# Patient Record
Sex: MALE | Race: OTHER | Employment: OTHER | URBAN - METROPOLITAN AREA
[De-identification: names, ages, dates, MRNs, and addresses within clinical notes are randomized per-mention and may not be internally consistent; named-entity substitution may affect disease eponyms.]

---

## 2018-03-22 ENCOUNTER — OFFICE VISIT (OUTPATIENT)
Dept: FAMILY MEDICINE CLINIC | Age: 23
End: 2018-03-22

## 2018-03-22 VITALS
TEMPERATURE: 97.8 F | HEIGHT: 62 IN | WEIGHT: 130 LBS | DIASTOLIC BLOOD PRESSURE: 84 MMHG | HEART RATE: 62 BPM | SYSTOLIC BLOOD PRESSURE: 136 MMHG | BODY MASS INDEX: 23.92 KG/M2

## 2018-03-22 DIAGNOSIS — K21.9 GASTROESOPHAGEAL REFLUX DISEASE, ESOPHAGITIS PRESENCE NOT SPECIFIED: Primary | ICD-10-CM

## 2018-03-22 RX ORDER — RANITIDINE 150 MG/1
150 TABLET, FILM COATED ORAL 2 TIMES DAILY
Qty: 60 TAB | Refills: 1 | Status: SHIPPED | OUTPATIENT
Start: 2018-03-22 | End: 2018-05-24 | Stop reason: ALTCHOICE

## 2018-03-22 NOTE — PROGRESS NOTES
Assessment/Plan:    Diagnoses and all orders for this visit:    1. Gastroesophageal reflux disease, esophagitis presence not specified  -     raNITIdine (ZANTAC) 150 mg tablet; Take 1 Tab by mouth two (2) times a day. [address] [employer] veces al alissa    Lifestyle changes encouraged  Recheck in 2 months    Follow-up Disposition:  Return in about 2 months (around 5/22/2018). CHARLOTTE Menezes expressed understanding of this plan. An AVS was printed and given to the patient.      ----------------------------------------------------------------------    Chief Complaint   Patient presents with    Abdominal Pain     pt c/o burning in abdomen and after eating it goes away but get nauseous x1 year. History of Present Illness:  24 yo generally healthy male here for one year of burning epigastric pain. Eating helps the pain for an hour or two then the pain returns. No weight changes. No vomiting. No diarrhea. No blood in stool. No change in stool. His daily food intake is made up almost entirely of fast food- pizza from [employer] for breakfast, etc. He almost never has home cooked meals. Almost never eats fruits/veggies. Sometimes he smokes cigarettes and sometimes he drinks beer      No past medical history on file. Current Outpatient Prescriptions   Medication Sig Dispense Refill    raNITIdine (ZANTAC) 150 mg tablet Take 1 Tab by mouth two (2) times a day. [place] [address] [employer] veces al alissa 60 Tab 1       No Known Allergies    Social History   Substance Use Topics    Smoking status: Current Some Day Smoker     Types: Cigarettes    Smokeless tobacco: Never Used      Comment: x5 a ek    Alcohol use Yes      Comment: x8 a week       No family history on file.     Physical Exam:     Visit Vitals    /84 (BP 1 Location: Right arm)    Pulse 62    Temp 97.8 °F (36.6 °C) (Oral)    Ht 5' 2.4\" (1.585 m)    Wt 130 lb (59 kg)    BMI 23.47 kg/m2       A&Ox3  WDWN NAD  Respirations normal and non labored  abd- flat, no distention, no masses, no HSM, not tender to palpation or percussion

## 2018-03-22 NOTE — PROGRESS NOTES
AVS printed and reviewed. E script discussed. Good RX coupon is not required. Cost at [facility] $4. Declined flu shot offered today. Discussion assisted by CAV , Yrn Horton.

## 2018-03-22 NOTE — PATIENT INSTRUCTIONS
Enfermedad de reflujo gastroesofágico (GERD): Instrucciones de cuidado - [ Gastroesophageal Reflux Disease (GERD): Care Instructions ]  Instrucciones de cuidado    La enfermedad de reflujo gastroesofágico (GERD, por bert siglas en inglés) es cuando el ácido estomacal se devuelve hacia el esófago. El esófago es el conducto que va de la garganta al [facility]. Olivia válvula de olivia prashanth vía rome que el ácido del estómago se devuelva por sakshi conducto. Cuando usted tiene GERD, esta válvula no se deric lo suficientemente firme. Si usted tiene síntomas leves de GERD, sadaf acidez estomacal, es posible que pueda controlar el problema con antiácidos o medicamentos de [employer]. Cambiar la alimentación, bajar de peso y hacer otros cambios en el estilo de vivek también pueden ayudar a reducir los síntomas. La atención de seguimiento es olivia parte clave de gates tratamiento y seguridad. Asegúrese de hacer y acudir a todas las citas, y llame a gates médico si está teniendo problemas. También es olivia buena idea saber los resultados de los exámenes y mantener olivia lista de los medicamentos que nohemy. ¿Cómo puede cuidarse en el hogar? · [place] [employer] medicamentos exactamente sadaf le fueron recetados. Llame a gates médico si celso estar teniendo problemas con gates medicamento. · Gates médico le podría recomendar medicamentos de [employer]. Para la indigestión leve u ocasional pueden servirle los antiácidos sadaf Tums, Gaviscon, Mylanta o Maalox. Gates médico también podría recomendar reductores de ácido de venta ant, sadaf Pepcid AC, Tagamet HB, Zantac 75 o Prilosec. Sherri y siga todas las instrucciones de la [place]. Si [place] estos medicamentos con frecuencia, hable con gates médico.  · Cambie bert hábitos alimenticios. ¨ Es mejor comer varias comidas pequeñas en lugar de dos o eran comidas abundantes. ¨ Después de comer, espere de 2 a 3 horas antes de recostarse. ¨ El chocolate, la menta y el alcohol pueden empeorar la GERD.   ¨ En [employer], los alimentos condimentados, los alimentos que tienen mucho ácido (sadaf los tomates y las naranjas) y el café pueden empeorar los síntomas de la GERD. Si adri síntomas empeoran después de comer un determinado alimento, se recomienda que deje de comer iwona alimento para bharati si adri síntomas mejoran. · No fume ni masque tabaco. Fumar puede agravar la GERD. Si necesita ayuda para dejar de usar tabaco, hable con gates médico [employer] y medicamentos para dejar de usarlo. Éstos pueden aumentar adri probabilidades de dejar el hábito para siempre. · Si tiene síntomas de GERD sari la noche, eleve la cabecera de gates cama entre 6 y 6 pulgadas (entre 15 y 20 cm) colocando ladrillos debajo del cristina de la cama o olivia cuña de espuma debajo de la cabecera del colchón. (Usar almohadas adicionales no funciona). · No use ropa que le ajuste mucho la parte media del cuerpo. · Baje de peso, si necesita hacerlo. Perder sólo de 5 a 10 libras (2.3 a 4.5 kg) puede ayudarle. ¿Cuándo debe pedir ayuda? Llame a gates médico ahora mismo o busque atención médica inmediata si:  ? · Tiene un dolor de estómago nuevo o distinto. ? · Adri heces son negruzcas y parecidas al alquitrán o tienen rastros de Angie. ?Preste especial atención a los cambios en gates ghada y asegúrese de comunicarse con gates médico si:  ? · Adri síntomas no paredes shoshana después de 2 días. ? · La comida parece quedarle atorada en la garganta o el pecho. ¿Dónde puede encontrar más información en inglés? Palomares Filler a [email or web address] Lelia Smith C146 en la búsqueda para aprender más acerca de \"Enfermedad de reflujo gastroesofágico (GERD): Instrucciones de cuidado - [ Gastroesophageal Reflux Disease (GERD): Care Instructions ]. \"  Revisado: 12 merida, 2017  Versión del contenido: 11.4  © 3085-2424 Healthwise, [employer].  Las instrucciones de cuidado fueron adaptadas bajo licencia por Good Help Connections (which disclaims liability or warranty for this information). Si usted tiene [place] afección médica o sobre estas instrucciones, siempre pregunte a gates profesional de ghada. [employer] [place] niega toda garantía o responsabilidad por gates uso de esta información.

## 2018-03-22 NOTE — MR AVS SNAPSHOT
Serabridget Gale 
 
 
 [address] [place] [address] 
[phone] Patient: Marylu Irizarry MRN: [id number] HGM:[id number] Visit Information Oralia Allen y Soraya Personal Médico Departamento Teléfono del Dep. Número de visita 3/22/2018  8:30 AM Zhou Zab[address], BLAINE [place] [phone] Follow-up Instructions Return in about 2 months (around 5/22/2018). Upcoming Health Maintenance Date Due DTaP/Tdap/Td series (1 - Tdap) 9/13/2014 Influenza Age 5 to Adult 8/1/2017 Alergias  Review Complete El: 3/22/2018 Por: Melissa Atkinson A partir del:  3/22/2018 No Known Allergies Vacunas actuales Rexine Smart No hay ninguna vacuna archivada. No revisadas esta visita You Were Diagnosed With   
  
 Lethia Lebenigno Gastroesophageal reflux disease, esophagitis presence not specified    -  Primary ICD-10-CM: K21.9 ICD-9-CM: 530.81 Partes vitales PS Pulso Temperatura [place] ( percentil de crecimiento) Peso (percentil de crecimiento) BMI (IMC)  
 136/84 (BP 1 Location: Right arm) 62 97.8 °F (36.6 °C) (Oral) 5' 2.4\" (1.585 m) 130 lb (59 kg) 23.47 kg/m2 Estatus de tabaquísmo Current Some Day Smoker Historial de signos vitales BMI and BSA Data Body Mass Index Body Surface Area  
 23.47 kg/m 2 1.61 m 2 [facility] Name Phone [address] [facility] [phone] Lam lista de medicamentos actualizada Lista actualizada 3/22/18  9:22 AM.  Rip Prieto use lam lista de medicamentos más reciente. raNITIdine 150 mg tablet También conocido sadaf:  ZANTAC Take 1 Tab by mouth two (2) times a day. [place] [address] [employer] al alissa Recetas Enviado a la [place]  Refills  
 raNITIdine (ZANTAC) 150 mg tablet 1  
 Sig: Take 1 Tab by mouth two (2) times a day. [place] 1 [place] [employer] al alissa Class: Normal  
 [facility] [address] [facility] [address] Jean Carlos Zeng  #: [phone] Route: Oral  
  
Instrucciones de seguimiento Return in about 2 months (around 5/22/2018). Instrucciones para el [place] Enfermedad de reflujo gastroesofágico (GERD): Instrucciones de cuidado - [ Gastroesophageal Reflux Disease (GERD): Care Instructions ] Instrucciones de cuidado La enfermedad de reflujo gastroesofágico (GERD, por bert siglas en inglés) es cuando el ácido estomacal se devuelve hacia el esófago. El esófago es el conducto que va de la garganta al \[facility] [place] [facility]\"". Olivia válvula de olivia prashanth vía rome que el ácido del estómago se devuelva por sakshi conducto. Cuando usted tiene GERD, esta válvula no se deric lo suficientemente firme. Si usted tiene síntomas leves de GERD, sadaf acidez estomacal, es posible que pueda controlar el problema con antiácidos o medicamentos de [place]. Cambiar la alimentación, bajar de peso y hacer otros cambios en el estilo de vivek también pueden ayudar a reducir los síntomas. La atención de seguimiento es olivia parte clave de gates tratamiento y seguridad. Asegúrese de hacer y acudir a todas las citas, y llame a gates médico si está teniendo problemas. También es olivia buena idea saber los resultados de los exámenes y mantener olivia lista de los medicamentos que nohemy. Cómo puede cuidarse en el hogar? · [employer] medicamentos exactamente sadaf le fueron recetados. Llame a gates médico si celso estar teniendo problemas con gates medicamento. · Gates médico le podría recomendar medicamentos de [place]. Para la indigestión leve u ocasional pueden servirle los antiácidos sadaf Tums, Gaviscon, Mylanta o Maalox. Gates médico también podría recomendar reductores de ácido de venta ant, sadaf Pepcid AC, Tagamet HB, Zantac 75 o Prilosec. Sherri y siga todas las instrucciones de la [place].  Si [place] estos medicamentos con frecuencia, hable con gates médico. 
· Cambie adri hábitos alimenticios. ¨ Es mejor comer varias comidas pequeñas en lugar de dos o eran comidas abundantes. ¨ Después de comer, espere de 2 a 3 horas antes de recostarse. ¨ El chocolate, la menta y el alcohol pueden empeorar la GERD. ¨ En [employer], los alimentos condimentados, los alimentos que tienen mucho ácido (sadaf los tomates y las naranjas) y el café pueden empeorar los síntomas de la GERD. Si adri síntomas empeoran después de comer un determinado alimento, se recomienda que deje de comer iwona alimento para bharati si adri síntomas mejoran. · No fume ni masque tabaco. Fumar puede agravar la GERD. Si necesita ayuda para dejar de usar tabaco, hable con gates médico [employer] y medicamentos para dejar de usarlo. Éstos pueden aumentar adri probabilidades de dejar el hábito para siempre. · Si tiene síntomas de GERD sari la noche, eleve la cabecera de gates cama entre 6 y 6 pulgadas (entre 15 y 20 cm) colocando ladrillos debajo del cristina de la cama o olivia cuña de espuma debajo de la cabecera del colchón. (Usar almohadas adicionales no funciona). · No use ropa que le ajuste mucho la parte media del cuerpo. · Baje de peso, si necesita hacerlo. Perder sólo de 5 a 10 libras (2.3 a 4.5 kg) puede ayudarle. Cuándo debe pedir ayuda? Llame a gates médico ahora mismo o busque atención médica inmediata si: 
? · Tiene un dolor de estómago nuevo o distinto. ? · Adri heces son negruzcas y parecidas al alquitrán o tienen rastros de Angie. ?Preste especial atención a los cambios en gates ghada y asegúrese de comunicarse con gates médico si: 
? · Adri síntomas no paredes shoshana después de 2 días. ? · La comida parece quedarle atorada en la garganta o el pecho. Dónde puede encontrar más información en inglés? Shu Lala a [email or web address]  
Ildefonso Castellano F123 en la búsqueda para aprender más acerca de \"Enfermedad de reflujo gastroesofágico (GERD): Instrucciones de cuidado - [ Gastroesophageal Reflux Disease (GERD): Care Instructions ]. \" 
Revisado: 12 merida, 2017 Versión del contenido: 11.4 © 1252-3666 Healthwise, Incorporated. Las instrucciones de cuidado fueron adaptadas bajo licencia por Good Help Connections (which disclaims liability or warranty for this information). Si usted tiene [place] afección médica o sobre estas instrucciones, siempre pregunte a gates profesional de ghada. Healthwise, Incorporated niega toda garantía o responsabilidad por gates uso de esta información. Introducing [facility]! [facility] introduce portal paciente MyChart . Ahora se puede acceder a partes de gates expediente médico, enviar por correo electrónico la oficina de gates médico y solicitar renovaciones de medicamentos en línea. En gates navegador de Internet , Urszula lopez [email or web address] Angel clic en el usuario por Irma Mcgregor? Dominique Pass clic aquí en la sesión [place]. Verá la página de registro [place]. Ingrese gates código de [employer] angeline y sadaf aparece a continuación. Usted no tendrá que [employer] código después de ethan completado el proceso de registro . Si usted no se inscribe antes de la fecha de caducidad , debe solicitar un nuevo código. · MyChart Código de acceso : [id number] Expires: 6/20/2018  9:22 AM 
 
Sheryle Shanks los últimos cuatro dígitos de gates Número de Seguro Social ( xxxx ) y fecha de nacimiento ( dd / mm / aaaa ) sadaf se indica y angel clic en Enviar. shaquille será llevado a la siguiente página de registro . Crear un ID MyChart . Esta será gates ID de inicio de sesión de MyChart y no puede ser [place] , por lo que pensar en olivia que es [place] y fácil de recordar . Crear olivia contraseña MyChart . Usted puede cambiar gates contraseña en cualquier momento . Ingrese gates Password Reset de preguntas y Mcdaniel .  [place] se puede utilizar en un momento posterior si usted olvida gates contraseña. Introduzca gates dirección de correo electrónico . Leonie Jacinto recibirá olivia notificación por correo electrónico cuando la nueva información está disponible en MyChart . Lyn brito en Registrarse. Violet Garre bharati y descargar porciones de gates expediente médico. 
Art clic en el enlace de descarga del menú Resumen para descargar olivia copia portátil de gates información médica . Si tiene [employer] , por favor visite la sección de preguntas frecuentes del sitio web MyChart . Recuerde, SocMetricshart NO es que se utilizará para las necesidades urgentes. Para emergencias médicas , llame al 911 . Ahora disponible en gates iPhone y Android ! Por favor proporcione sakshi resumen de la documentación de cuidado a gates próximo proveedor. If you have any questions after today's visit, please call [phone].

## 2018-05-24 ENCOUNTER — OFFICE VISIT (OUTPATIENT)
Dept: FAMILY MEDICINE CLINIC | Age: 23
End: 2018-05-24

## 2018-05-24 ENCOUNTER — HOSPITAL ENCOUNTER (OUTPATIENT)
Dept: LAB | Age: 23
Discharge: HOME | End: 2018-05-24

## 2018-05-24 VITALS
WEIGHT: 129.6 LBS | TEMPERATURE: 98 F | BODY MASS INDEX: 22.96 KG/M2 | DIASTOLIC BLOOD PRESSURE: 56 MMHG | HEART RATE: 56 BPM | SYSTOLIC BLOOD PRESSURE: 97 MMHG | HEIGHT: 63 IN

## 2018-05-24 DIAGNOSIS — K21.9 GASTROESOPHAGEAL REFLUX DISEASE, ESOPHAGITIS PRESENCE NOT SPECIFIED: Primary | ICD-10-CM

## 2018-05-24 DIAGNOSIS — R35.0 URINARY FREQUENCY: ICD-10-CM

## 2018-05-24 PROCEDURE — 81001 URINALYSIS AUTO W/SCOPE: CPT | Performed by: NURSE PRACTITIONER

## 2018-05-24 PROCEDURE — 87086 URINE CULTURE/COLONY COUNT: CPT | Performed by: NURSE PRACTITIONER

## 2018-05-24 PROCEDURE — 87491 CHLMYD TRACH DNA AMP PROBE: CPT | Performed by: NURSE PRACTITIONER

## 2018-05-24 PROCEDURE — 87591 N.GONORRHOEAE DNA AMP PROB: CPT | Performed by: NURSE PRACTITIONER

## 2018-05-24 RX ORDER — PANTOPRAZOLE SODIUM 40 MG/1
40 TABLET, DELAYED RELEASE ORAL DAILY
Qty: 30 TAB | Refills: 3 | Status: SHIPPED | OUTPATIENT
Start: 2018-05-24 | End: 2018-07-05 | Stop reason: ALTCHOICE

## 2018-05-24 NOTE — PROGRESS NOTES
Printed AVS, provided to pt and reviewed. Pt indicated understanding and had no questions. Told pt that rx's have been sent to pharmacy and they should be ready for  in approximately 2 hrs. Pt told to please present GoodRx. com coupon which we provided to your pharmacy to receive discounted price. Reviewed all medications ordered today with the pt. Sae Faulkner was the . The pt was given the handout on foods to help with GERD called the stoplight guide. It was reviewed with the pt.    Quan Valencia RN

## 2018-05-24 NOTE — PROGRESS NOTES
Chief Complaint   Patient presents with    GERD     follow up     Coordination of Care  1. Have you been to the ER, urgent care clinic since your last visit? Hospitalized since your last visit? No    2. Have you seen or consulted any other health care providers outside of the [facility] since your last visit? Include any pap smears or colon screening. No    Does the patient need refills? NO    Learning Assessment Complete?  yes

## 2018-05-24 NOTE — PATIENT INSTRUCTIONS
Enfermedad de reflujo gastroesofágico (GERD): Instrucciones de cuidado - [ Gastroesophageal Reflux Disease (GERD): Care Instructions ]  Instrucciones de cuidado    La enfermedad de reflujo gastroesofágico (GERD, por bert siglas en inglés) es cuando el ácido estomacal se devuelve hacia el esófago. El esófago es el conducto que va de la garganta al [facility]. Olivia válvula de olivia prashanth vía rome que el ácido del estómago se devuelva por sakshi conducto. Cuando usted tiene GERD, esta válvula no se deric lo suficientemente firme. Si usted tiene síntomas leves de GERD, sadaf acidez estomacal, es posible que pueda controlar el problema con antiácidos o medicamentos de [employer]. Cambiar la alimentación, bajar de peso y hacer otros cambios en el estilo de vivek también pueden ayudar a reducir los síntomas. La atención de seguimiento es olivia parte clave de gates tratamiento y seguridad. Asegúrese de hacer y acudir a todas las citas, y llame a gates médico si está teniendo problemas. También es olivia buena idea saber los resultados de los exámenes y mantener olivia lista de los medicamentos que nohemy. ¿Cómo puede cuidarse en el hogar? · [place] [employer] medicamentos exactamente sadaf le fueron recetados. Llame a gates médico si celso estar teniendo problemas con gates medicamento. · Gates médico le podría recomendar medicamentos de [employer]. Para la indigestión leve u ocasional pueden servirle los antiácidos sadaf Tums, Gaviscon, Mylanta o Maalox. Gates médico también podría recomendar reductores de ácido de venta ant, sadaf Pepcid AC, Tagamet HB, Zantac 75 o Prilosec. Sherri y siga todas las instrucciones de la [place]. Si [place] estos medicamentos con frecuencia, hable con gates médico.  · Cambie bert hábitos alimenticios. ¨ Es mejor comer varias comidas pequeñas en lugar de dos o eran comidas abundantes. ¨ Después de comer, espere de 2 a 3 horas antes de recostarse. ¨ El chocolate, la menta y el alcohol pueden empeorar la GERD.   ¨ En [employer], los alimentos condimentados, los alimentos que tienen mucho ácido (sadaf los tomates y las naranjas) y el café pueden empeorar los síntomas de la GERD. Si adri síntomas empeoran después de comer un determinado alimento, se recomienda que deje de comer iwona alimento para bharati si adri síntomas mejoran. · No fume ni masque tabaco. Fumar puede agravar la GERD. Si necesita ayuda para dejar de usar tabaco, hable con gates médico [employer] y medicamentos para dejar de usarlo. Éstos pueden aumentar adri probabilidades de dejar el hábito para siempre. · Si tiene síntomas de GERD sari la noche, eleve la cabecera de gates cama entre 6 y 6 pulgadas (entre 15 y 20 cm) colocando ladrillos debajo del cristina de la cama o olivia cuña de espuma debajo de la cabecera del colchón. (Usar almohadas adicionales no funciona). · No use ropa que le ajuste mucho la parte media del cuerpo. · Baje de peso, si necesita hacerlo. Perder sólo de 5 a 10 libras (2.3 a 4.5 kg) puede ayudarle. ¿Cuándo debe pedir ayuda? Llame a gates médico ahora mismo o busque atención médica inmediata si:  ? · Tiene un dolor de estómago nuevo o distinto. ? · Adri heces son negruzcas y parecidas al alquitrán o tienen rastros de Angie. ?Preste especial atención a los cambios en gates ghada y asegúrese de comunicarse con gates médico si:  ? · Adri síntomas no paredes shoshana después de 2 días. ? · La comida parece quedarle atorada en la garganta o el pecho. ¿Dónde puede encontrar más información en inglés? Jacobo lopez [email or web address] Jackelyn Lambert [id number] en la búsqueda para aprender más acerca de \"Enfermedad de reflujo gastroesofágico (GERD): Instrucciones de cuidado - [ Gastroesophageal Reflux Disease (GERD): Care Instructions ]. \"  Revisado: 12 merida, 2017  Versión del contenido: 11.4  © 1311-8581 Healthwise, [employer].  Las instrucciones de cuidado fueron adaptadas bajo licencia por Good Help Connections (which disclaims liability or warranty for this information). Si usted tiene [place] afección médica o sobre estas instrucciones, siempre pregunte a gates profesional de ghada. [employer]orporated niega toda garantía o responsabilidad por gates uso de esta información.

## 2018-05-24 NOTE — PROGRESS NOTES
Subjective:     Chief Complaint   Patient presents with    GERD     follow up        He  is a 23 y.o. male who presents for evaluation of GERD. Since LOV, Pt reports only minor improvement w/ espigrastric pain/burning. Also c/o of occasional emesis, occurring approx 2-3x week, usually around lunch. Continues to take Zantac BID. No assoc hematochezia nor    Also c/o of urinary frequency x 3-4 weeks and L testicular pain. No injury to groin. No Hx of hernias. No assoc scrotal edema nor skin changes to genitals. ROS  Gen - no fever/chills  Resp - no dyspnea or cough  CV - no chest pain or ORTEGA  Rest per HPI    History reviewed. No pertinent past medical history. History reviewed. No pertinent surgical history. Current Outpatient Prescriptions on File Prior to Visit   Medication Sig Dispense Refill    raNITIdine (ZANTAC) 150 mg tablet Take 1 Tab by mouth two (2) times a day. [place] 1 [employer] veces al alissa 60 Tab 1     No current facility-administered medications on file prior to visit. Objective:     Vitals:    05/24/18 1005   BP: 97/56   Pulse: (!) 56   Temp: 98 °F (36.7 °C)   TempSrc: Oral   Weight: 129 lb 9.6 oz (58.8 kg)   Height: 5' 2.8\" (1.595 m)       Physical Examination:  General appearance - alert, well appearing, and in no distress  Eyes -sclera anicteric  Neck - supple, no significant adenopathy, no thyromegaly  Chest - clear to auscultation, no wheezes, rales or rhonchi, symmetric air entry  Heart - normal rate, regular rhythm, normal S1, S2, no murmurs, rubs, clicks or gallops  Neurological - alert, oriented, no focal findings or movement disorder noted  Abdomen-BS present/WNL x 4 quads, non-tender/distended, soft,no organomegaly     exam unremarkable, no hernia nor testicular masses noted. Assessment/ Plan:   Diagnoses and all orders for this visit:    1.  Gastroesophageal reflux disease, esophagitis presence not specified  -     H PYLORI AG, STOOL; Future  -     pantoprazole (PROTONIX) 40 mg tablet; Take 1 Tab by mouth daily. 2. Urinary frequency  -     CHLAMYDIA/GC PCR; Future  -     CULTURE, URINE; Future  -     URINALYSIS W/ RFLX MICROSCOPIC; Future       Stop Zantac, start PPI once Pt has returned stool specimen to lab given potential for PPI to alter stool test results. Check urine studies r/t L testicle pain, suspect possible g/c. Discussed symptom management. RTC PRN for RN visit if stool test + for triple therapy discussion and/or STI discussion. Re-eval both S&S in 6-8 weeks    I have discussed the diagnosis with the patient and the intended plan as seen in the above orders. The patient has received an after-visit summary and questions were answered concerning future plans. I have discussed medication side effects and warnings with the patient as well. The patient verbalizes understanding and agreement with the plan.     Follow-up Disposition: Not on File

## 2018-05-25 LAB
APPEARANCE UR: ABNORMAL
BACTERIA URNS QL MICRO: NEGATIVE /HPF
BILIRUB UR QL: NEGATIVE
C TRACH DNA SPEC QL NAA+PROBE: NEGATIVE
COLOR UR: ABNORMAL
EPITH CASTS URNS QL MICRO: ABNORMAL /LPF
GLUCOSE UR STRIP.AUTO-MCNC: NEGATIVE MG/DL
HGB UR QL STRIP: NEGATIVE
HYALINE CASTS URNS QL MICRO: ABNORMAL /LPF (ref 0–5)
KETONES UR QL STRIP.AUTO: NEGATIVE MG/DL
LEUKOCYTE ESTERASE UR QL STRIP.AUTO: NEGATIVE
N GONORRHOEA DNA SPEC QL NAA+PROBE: NEGATIVE
NITRITE UR QL STRIP.AUTO: NEGATIVE
PH UR STRIP: 6 [PH] (ref 5–8)
PROT UR STRIP-MCNC: NEGATIVE MG/DL
RBC #/AREA URNS HPF: ABNORMAL /HPF (ref 0–5)
SAMPLE TYPE: NORMAL
SERVICE CMNT-IMP: NORMAL
SP GR UR REFRACTOMETRY: 1.03 (ref 1–1.03)
SPECIMEN SOURCE: NORMAL
UROBILINOGEN UR QL STRIP.AUTO: 0.2 EU/DL (ref 0.2–1)
WBC URNS QL MICRO: ABNORMAL /HPF (ref 0–4)

## 2018-05-26 LAB
BACTERIA SPEC CULT: NORMAL
CC UR VC: NORMAL
SERVICE CMNT-IMP: NORMAL

## 2018-06-07 ENCOUNTER — HOSPITAL ENCOUNTER (OUTPATIENT)
Dept: LAB | Age: 23
Discharge: HOME | End: 2018-06-07

## 2018-06-07 ENCOUNTER — LAB ONLY (OUTPATIENT)
Dept: FAMILY MEDICINE CLINIC | Age: 23
End: 2018-06-07

## 2018-06-07 DIAGNOSIS — K21.9 GASTROESOPHAGEAL REFLUX DISEASE, ESOPHAGITIS PRESENCE NOT SPECIFIED: ICD-10-CM

## 2018-06-07 PROCEDURE — 87338 HPYLORI STOOL AG IA: CPT | Performed by: NURSE PRACTITIONER

## 2018-06-11 LAB
H PYLORI AG STL QL IA: NEGATIVE
SPECIMEN SOURCE: NORMAL

## 2018-07-05 ENCOUNTER — HOSPITAL ENCOUNTER (OUTPATIENT)
Dept: LAB | Age: 23
Discharge: HOME | End: 2018-07-05

## 2018-07-05 ENCOUNTER — OFFICE VISIT (OUTPATIENT)
Dept: FAMILY MEDICINE CLINIC | Age: 23
End: 2018-07-05

## 2018-07-05 VITALS
BODY MASS INDEX: 23.6 KG/M2 | WEIGHT: 133.2 LBS | HEART RATE: 57 BPM | HEIGHT: 63 IN | DIASTOLIC BLOOD PRESSURE: 68 MMHG | TEMPERATURE: 97.8 F | SYSTOLIC BLOOD PRESSURE: 113 MMHG

## 2018-07-05 DIAGNOSIS — K29.50 CHRONIC GASTRITIS, PRESENCE OF BLEEDING UNSPECIFIED, UNSPECIFIED GASTRITIS TYPE: ICD-10-CM

## 2018-07-05 DIAGNOSIS — K29.50 CHRONIC GASTRITIS, PRESENCE OF BLEEDING UNSPECIFIED, UNSPECIFIED GASTRITIS TYPE: Primary | ICD-10-CM

## 2018-07-05 LAB
ALBUMIN SERPL-MCNC: 4 G/DL (ref 3.5–5)
ALBUMIN/GLOB SERPL: 1.2 {RATIO} (ref 1.1–2.2)
ALP SERPL-CCNC: 76 U/L (ref 45–117)
ALT SERPL-CCNC: 29 U/L (ref 12–78)
ANION GAP SERPL CALC-SCNC: 9 MMOL/L (ref 5–15)
AST SERPL-CCNC: 22 U/L (ref 15–37)
BILIRUB SERPL-MCNC: 0.7 MG/DL (ref 0.2–1)
BUN SERPL-MCNC: 16 MG/DL (ref 6–20)
BUN/CREAT SERPL: 18 (ref 12–20)
CALCIUM SERPL-MCNC: 8.9 MG/DL (ref 8.5–10.1)
CHLORIDE SERPL-SCNC: 103 MMOL/L (ref 97–108)
CO2 SERPL-SCNC: 29 MMOL/L (ref 21–32)
CREAT SERPL-MCNC: 0.91 MG/DL (ref 0.7–1.3)
ERYTHROCYTE [DISTWIDTH] IN BLOOD BY AUTOMATED COUNT: 12.4 % (ref 11.5–14.5)
GLOBULIN SER CALC-MCNC: 3.3 G/DL (ref 2–4)
GLUCOSE SERPL-MCNC: 90 MG/DL (ref 65–100)
HCT VFR BLD AUTO: 43.4 % (ref 36.6–50.3)
HGB BLD-MCNC: 14.4 G/DL (ref 12.1–17)
LIPASE SERPL-CCNC: 111 U/L (ref 73–393)
MCH RBC QN AUTO: 31.4 PG (ref 26–34)
MCHC RBC AUTO-ENTMCNC: 33.2 G/DL (ref 30–36.5)
MCV RBC AUTO: 94.6 FL (ref 80–99)
NRBC # BLD: 0 K/UL (ref 0–0.01)
NRBC BLD-RTO: 0 PER 100 WBC
PLATELET # BLD AUTO: 221 K/UL (ref 150–400)
PMV BLD AUTO: 9.9 FL (ref 8.9–12.9)
POTASSIUM SERPL-SCNC: 4.1 MMOL/L (ref 3.5–5.1)
PROT SERPL-MCNC: 7.3 G/DL (ref 6.4–8.2)
RBC # BLD AUTO: 4.59 M/UL (ref 4.1–5.7)
SODIUM SERPL-SCNC: 141 MMOL/L (ref 136–145)
WBC # BLD AUTO: 3.7 K/UL (ref 4.1–11.1)

## 2018-07-05 PROCEDURE — 80053 COMPREHEN METABOLIC PANEL: CPT | Performed by: NURSE PRACTITIONER

## 2018-07-05 PROCEDURE — 85027 COMPLETE CBC AUTOMATED: CPT | Performed by: NURSE PRACTITIONER

## 2018-07-05 PROCEDURE — 83690 ASSAY OF LIPASE: CPT | Performed by: NURSE PRACTITIONER

## 2018-07-05 RX ORDER — RANITIDINE 150 MG/1
150 TABLET, FILM COATED ORAL 2 TIMES DAILY
Qty: 60 TAB | Refills: 3 | Status: SHIPPED | OUTPATIENT
Start: 2018-07-05 | End: 2018-08-30 | Stop reason: SDUPTHER

## 2018-07-05 NOTE — PATIENT INSTRUCTIONS
Gastritis: Instrucciones de cuidado - [ Gastritis: Care Instructions ]  Instrucciones de cuidado    La gastritis es dolor y malestar estomacal. Sucede cuando algo irrita el revestimiento del estómago. Hay muchas cosas que pueden causarla. Entre estas se incluyen olivia infección sadaf la gripe o algo que ha comido o bebido. Los medicamentos o olivia llaga en el recubrimiento del estómago ([place]) también pueden causarla. Puede tener abotagamiento y dolor abdominal. Podría eructar, vomitar y tener revoltura estomacal.  Usted debería poder aliviar el problema tomando medicamentos. Y angeline vez sería útil cambiar la alimentación. Si la gastritis continúa, gates médico podría recetarle medicamentos. La atención de seguimiento es olivia parte clave de gates tratamiento y seguridad. Asegúrese de hacer y acudir a todas las citas, y llame a gates médico si está teniendo problemas. También es olivia buena idea saber los resultados de los exámenes y mantener olivia lista de los medicamentos que nohemy. ¿Cómo puede cuidarse en el hogar? · Si gates médico le recetó antibióticos, tómelos según las indicaciones. No deje de tomarlos por el hecho de sentirse mejor. Debe saqib todos los antibióticos hasta terminarlos. · Sea tom con los medicamentos. Si gates médico le recetó un medicamento para reducir el ácido estomacal, tómelo según las indicaciones. Llame a gates médico si celso estar teniendo problemas con gates medicamento. · No tome ningún otro medicamento, incluyendo analgésicos (medicamentos para el dolor) de venta ant, sin consultar con gates médico oliva. · Si gates médico le recomienda medicamentos de venta ant para reducir el ácido estomacal, tales sadaf Pepcid AC, Prilosec, Tagamet HB o Zantac 75, siga las instrucciones de la etiqueta. · Zuleyma abundantes líquidos (los suficientes sadaf para que gates orina sea de color amarillo dahiana o transparente sadaf el agua) para prevenir la deshidratación. Elija saqib agua y otros líquidos fidelia sin cafeína.  Si tiene [place] [employer] [facility] [employer], el corazón o el hígado y tiene que Esau's líquidos, hable con gates médico antes de aumentar gates consumo. · Limite la cantidad de alcohol que tanner. · Evite el café, el té, las bebidas de cola, el chocolate y otros alimentos que contengan cafeína. Aumentan el ácido estomacal.  ¿Cuándo debe pedir ayuda? Llame al 911 en cualquier momento que considere que necesita atención de [place]. Por ejemplo, llame si:  ? · Vomita gabi o algo parecido a granos de café molido. ? · Adri heces son de color rojizo o muy sanguinolentas (con gabi). ?Llame a gates médico ahora mismo o busque atención médica inmediata si:  ? · Empieza a respirar en forma acelerada y no ha producido [place] en las últimas 8 horas. ? · No puede retener líquidos en el estómago. ?Preste especial atención a los cambios en gates ghada y asegúrese de comunicarse con gates médico si:  ? · No mejora sadaf se esperaba. ¿Dónde puede encontrar más información en inglés? Adam Labella a [email or web address] [facility] A377 en la búsqueda para aprender más acerca de \"Gastritis: Instrucciones de cuidado - [ Gastritis: Care Instructions ]. \"  Revisado: 12 merida, 2017  Versión del contenido: 11.4  © 8427-5630 Healthwise, Incorporated. Las instrucciones de cuidado fueron adaptadas bajo licencia por [employer] (which disclaims liability or warranty for this information). Si usted tiene [place] afección médica o sobre estas instrucciones, siempre pregunte a gates profesional de ghada. Healthwise, Incorporated niega toda garantía o responsabilidad por gates uso de esta información.

## 2018-07-05 NOTE — PROGRESS NOTES
Coordination of Care  1. Have you been to the ER, urgent care clinic since your last visit? Hospitalized since your last visit? No    2. Have you seen or consulted any other health care providers outside of the [address] [place] [address] since your last visit? Include any pap smears or colon screening. No    Does the patient need refills? NO    Learning Assessment Complete?  yes  Depression Screening complete in the past 12 months? yes

## 2018-07-05 NOTE — PROGRESS NOTES
Subjective:     Chief Complaint   Patient presents with    Epigastric Pain    Results     LAB        He  is a 23 y.o. male who presents for evaluation of epigastric pain/burning. Since LOV, Pt notes mild improvement w/ PPI use. Pre-prandial pain is relieved w/ eating. Completed stool study since LOV. No assoc emesis, diarrhea, constipation nor hematochezia. Some assoc nausea while eating. Pain is generalized in abdomen. No other attempted. Orig onset > 2 years ago. mild etoh use, usually 1-3 drinks 1-2x week. ROS  Gen - no fever/chills  Resp - no dyspnea or cough  CV - no chest pain or ORTEGA  Rest per HPI    No past medical history on file. No past surgical history on file. Current Outpatient Prescriptions on File Prior to Visit   Medication Sig Dispense Refill    pantoprazole (PROTONIX) 40 mg tablet Take 1 Tab by mouth daily. 30 Tab 3     No current facility-administered medications on file prior to visit. Objective:     Vitals:    07/05/18 1042   BP: 113/68   Pulse: (!) 57   Temp: 97.8 °F (36.6 °C)   TempSrc: Oral   Weight: 133 lb 3.2 oz (60.4 kg)   Height: 5' 2.56\" (1.589 m)       Physical Examination:  General appearance - alert, well appearing, and in no distress  Eyes -sclera anicteric  Neck - supple, no significant adenopathy, no thyromegaly  Chest - clear to auscultation, no wheezes, rales or rhonchi, symmetric air entry  Heart - normal rate, regular rhythm, normal S1, S2, no murmurs, rubs, clicks or gallops  Neurological - alert, oriented, no focal findings or movement disorder noted  Abdomen-BS present/WNL x 4 quads, non-distended, soft,no organomegaly    + epigastric tenderness noted and periumbical/midline     Assessment/ Plan:   Diagnoses and all orders for this visit:    1. Chronic gastritis, presence of bleeding unspecified, unspecified gastritis type  -     METABOLIC PANEL, COMPREHENSIVE; Future  -     CBC W/O DIFF;  Future  - LIPASE; Future  -     raNITIdine (ZANTAC) 150 mg tablet; Take 1 Tab by mouth two (2) times a day. 1 hr AC. [place] 1 tableta por boca 2 veces al alissa 1 hra antes de comer.  -     REFERRAL TO GASTROENTEROLOGY       Given H pylori neg stool ABs and mixed/poor response to PPI, check baseline labs w/ lipase and refer to Gastroenterology via AN. Stop PPI and try Zantac for relief, 1 hr before meals on an empty stomach only w/ water. Change POC for abnormal labs. Re-eval in 6-8 weeks s/p GI consult. Advised to avoid NSAIDs and limit etoh use to help w/ gastritis S&S. I have discussed the diagnosis with the patient and the intended plan as seen in the above orders. The patient has received an after-visit summary and questions were answered concerning future plans. I have discussed medication side effects and warnings with the patient as well. The patient verbalizes understanding and agreement with the plan.     Follow-up Disposition: Not on File

## 2018-07-05 NOTE — PROGRESS NOTES
Reviewed AVS, prescription and pharmacy location with patient. Patient aware that he needs to meet with Augustin TENA to complete AN application for Gastro. Patient will call in 2 weeks if Ce Givens has not call him by then. Directed patient to registration to make provider f/u appt in 6-8 weeks. Patient verbalized understanding . No questions or concern from patient at this time.  Nader Garcia RN

## 2018-07-05 NOTE — MR AVS SNAPSHOT
[address] [facility] 
 
 
 [address] [place] [address] 
[phone] Patient: Edgar Lucas MRN: [id number] MNN:[id number] Visit Information Liza Rist y [place] Personal Médico Departamento Teléfono del Dep. Número de visita 7/5/2018 10:30 AM Tien Garcia NP [address] [phone] Follow-up Instructions Return in about 6 weeks (around 8/16/2018). Your Appointments 8/30/2018  2:00 PM  
WALK IN with Tien Garcia NP  
[address] ([address]) Appt Note: FU per SO by   
 [address] [facility] 7 [id number]  
[phone]  
  
   
 [address] [place] [phone] Upcoming Health Maintenance Date Due Pneumococcal 19-64 Medium Risk (1 of 1 - PPSV23) 9/13/2012 DTaP/Tdap/Td series (1 - Tdap) 9/13/2014 Influenza Age 5 to Adult 8/1/2018 Alergias  Review Complete El: 7/5/2018 Por: CHRISSIE Hooks del:  7/5/2018 No Known Allergies Vacunas actuales Sumeet Basque No hay ninguna vacuna archivada. No revisadas esta visita You Were Diagnosed With   
  
 Andreas Desanctis Chronic gastritis, presence of bleeding unspecified, unspecified gastritis type    -  Primary ICD-10-CM: K29.50 ICD-9-CM: 535.10 Partes vitales PS Pulso Temperatura Lena ( percentil de crecimiento) Peso (percentil de crecimiento) BMI (IMC)  
 113/68 (BP 1 Location: Right arm) (!) 57 97.8 °F (36.6 °C) (Oral) 5' 2.56\" (1.589 m) 133 lb 3.2 oz (60.4 kg) 23.93 kg/m2 Estatus de tabaquísmo Former Smoker Historial de signos vitales BMI and BSA Data Body Mass Index Body Surface Area  
 23.93 kg/m 2 1.63 m 2 [facility] Name Phone [address] [place] [phone] Lam lista de medicamentos actualizada Lista actualizada 7/5/18  1:28 PM.  Reji Mkceon use lam lista de medicamentos más reciente. raNITIdine 150 mg tablet También conocido sadaf:  ZANTAC Take 1 Tab by mouth two (2) times a day. 1 hr AC. [place] 1 tableta por boca 2 veces al alissa 1 hra antes de comer. Recetas Enviado a la [place] Refills  
 raNITIdine (ZANTAC) 150 mg tablet 3 Sig: Take 1 Tab by mouth two (2) times a day. 1 hr AC. [place] 1 tableta por boca 2 veces al alissa 1 hra antes de comer. Class: Normal  
 Pharmacy: [facility] [address], [address] [facility] Ph #: [phone] Route: Oral  
  
Hicimos lo siguiente REFERRAL TO GASTROENTEROLOGY [UKE78 Custom] Comentarios: Access Now Referral Request Form: 
 
Has the patient live in the area for 6 months Yes Is the patient here on a visa No 
 
Priority: 
 
4 weeks Location: [facility] Patient Demographics: 
 
Date:  7/5/2018                          EMR# [id number] Boris Sauceda T[address] 1995 Home Phone : [phone]             Cell Phone:   
 
Language :  Mexican Specialist Requested: Gastroenterology Reason for Request:  Chronic gastritis S&S, H pylori stool AB negative. Mixed/poor response to PPI/H2Bs. Specialist Requirements: 
 
If GYN Referral:   
Pap: n/a If Ortho Referral: MRI n/a      
XRAY: n/a Pulmonary Referrals must have :  
C-X-ray n/a OR  
CT Scan n/a Referring Provider:  Jae Heath NP Instrucciones de seguimiento Return in about 6 weeks (around 8/16/2018). Informacion de [employer] de Referencia Referido por Referido a  
  
 [id number] Chinedu Ryley No disponible Visitas Estado Fecha de inicio [place] final  
 1 New Request 7/5/18 7/5/19  Si lam referencia tiene un estado de \"pending review\" o \"denied\" , informacion adicional sera enviada para apoyar el resultado de esta decision. Instrucciones para el Paciente Gastritis: Instrucciones de cuidado - [ Gastritis: Care Instructions ] Instrucciones de cuidado La gastritis es dolor y malestar estomacal. Sucede cuando algo irrita el revestimiento del DWAIN. Hay muchas cosas que pueden causarla. Entre estas se incluyen olivia infección sadaf la gripe o algo que ha comido o bebido. Los medicamentos o olivia llaga en el recubrimiento del estómago ([place]) también pueden causarla. Puede tener abotagamiento y dolor abdominal. Podría eructar, vomitar y tener revoltura estomacal. 
Usted debería poder aliviar el problema tomando medicamentos. Y angeline vez sería útil cambiar la alimentación. Si la gastritis continúa, gates médico podría recetarle medicamentos. La atención de seguimiento es olivia parte clave de gates tratamiento y seguridad. Asegúrese de hacer y acudir a todas las citas, y llame a gates médico si está teniendo problemas. También es olivia buena idea saber los resultados de los exámenes y mantener olivia lista de los medicamentos que nohemy. Cómo puede cuidarse en el hogar? · Si gates médico le recetó antibióticos, tómelos según las indicaciones. No deje de tomarlos por el hecho de sentirse mejor. Debe saqib todos los antibióticos hasta terminarlos. · Sea tom con los medicamentos. Si gates médico le recetó un medicamento para reducir el ácido estomacal, tómelo según las indicaciones. Llame a gates médico si celso estar teniendo problemas con gates medicamento. · No tome ningún otro medicamento, incluyendo analgésicos (medicamentos para el dolor) de venta ant, sin consultar con gates médico oliva. · Si gates médico le recomienda medicamentos de venta ant para reducir el ácido estomacal, tales sadaf Pepcid AC, Prilosec, Tagamet HB o Zantac 75, siga las instrucciones de la etiqueta.  
· Zuleyma abundantes líquidos (los suficientes sadaf para que gates orina sea de color amarillo dahiana o transparente sadaf el agua) para prevenir la deshidratación. Elija saqib agua y otros líquidos fidelia sin cafeína. Si tiene [facility] Financial, el corazón o el hígado y tiene que [place]'s líquidos, hable con gates médico antes de aumentar gates consumo. · Limite la cantidad de alcohol que tanner. · Evite el café, el té, las bebidas de cola, el chocolate y otros alimentos que contengan cafeína. Aumentan el ácido estomacal. 

Cuándo debe pedir ayuda? Llame al 911 en cualquier momento que considere que necesita atención de [place]. Por ejemplo, llame si: 
? · Vomita gabi o algo parecido a granos de café molido. ? · Adri heces son de color rojizo o muy sanguinolentas (con gabi). ?Llame a gates médico ahora mismo o busque atención médica inmediata si: 
? · Empieza a respirar en forma acelerada y no ha producido Damien Prose en las últimas 8 horas. ? · No puede retener líquidos en el estómago. ?Preste especial atención a los cambios en gates ghada y asegúrese de comunicarse con gates médico si: 
? · No mejora sadaf se esperaba. Dónde puede encontrar más información en inglés? Tiffanie Shannan a [email or web address] Steffanie Patton P420 en la búsqueda para aprender más acerca de \"Gastritis: Instrucciones de cuidado - [ Gastritis: Care Instructions ]. \" 
Revisado: 12 merida, 2017 Versión del contenido: 11.4 © 4363-1606 Healthwise, [employer]. Las instrucciones de cuidado fueron adaptadas bajo licencia por Good Help [employer]ctions (which disclaims liability or warranty for this information). Si usted tiene [place] afección médica o sobre estas instrucciones, siempre pregunte a gates profesional de ghada. [employer] niega toda garantía o responsabilidad por gates uso de esta información. Introducing [facility]! [facility] introduce portal paciente MyChart . Ahora se puede acceder a partes de gates expediente médico, enviar por correo electrónico la oficina de gates médico y solicitar renovaciones de medicamentos en línea. En gates navegador de Internet , Regine  a [email or web address] Angel clic en el usuario por Janae Ulloa? Maria Del Rosario Rideau clic aquí en la sesión Pedro Cadet. Verá la página de registro Mercedes. Ingrese gates código de [employer] angeline y sadaf aparece a continuación. Usted no tendrá que [employer] código después de ethan completado el proceso de registro . Si usted no se inscribe antes de la fecha de caducidad , debe solicitar un nuevo código. · MyChart Código de acceso : [id number] Expires: 10/3/2018  1:28 PM 
 
Ingresa los últimos cuatro dígitos de gates Número de Seguro Social ( xxxx ) y fecha de nacimiento ( dd / mm / aaaa ) sadaf se indica y angel clic en Enviar. Usted será llevado a la siguiente página de registro . Crear un ID MyChart . Esta será gates ID de inicio de sesión de MyChart y no puede ser [place] , por lo que pensar en olivai que es Amy Leech y fácil de recordar . Crear olivia contraseña MyChart . Usted puede cambiar gates contraseña en cualquier momento . Ingrese gates Password Reset de preguntas y Mcdaniel . [place] se puede utilizar en un momento posterior si usted olvida gates contraseña. Introduzca gates dirección de correo electrónico . Héctor Hammond recibirá olivia notificación por correo electrónico cuando la nueva información está disponible en MyChart . Samson Ferguson clic en Registrarse. Danuta Farhan bharati y descargar porciones de gates expediente médico. 
Angel clic en el enlace de descarga del menú Resumen para descargar olivia copia portátil de gates información médica . Si tiene [employer] , por favor visite la sección de preguntas frecuentes del sitio web MyChart . Recuerde, MyChart NO es que se utilizará para las necesidades urgentes. Para emergencias médicas , llame al 911 . Ahora disponible en gates iPhone y Android ! Por favor proporcione sakshi resumen de la documentación de cuidado a gates próximo proveedor. If you have any questions after today's visit, please call [phone].

## 2018-07-11 NOTE — PROGRESS NOTES
Trace dip in WBCs, repeat cbc w/ diff and/or periph smear at [facility]. Other labs unremarkable r/t gastritis.  Cont POC

## 2018-08-30 ENCOUNTER — OFFICE VISIT (OUTPATIENT)
Dept: FAMILY MEDICINE CLINIC | Age: 23
End: 2018-08-30

## 2018-08-30 VITALS
HEART RATE: 59 BPM | HEIGHT: 63 IN | DIASTOLIC BLOOD PRESSURE: 68 MMHG | WEIGHT: 134.4 LBS | BODY MASS INDEX: 23.81 KG/M2 | SYSTOLIC BLOOD PRESSURE: 118 MMHG | TEMPERATURE: 98.3 F

## 2018-08-30 DIAGNOSIS — K29.50 CHRONIC GASTRITIS, PRESENCE OF BLEEDING UNSPECIFIED, UNSPECIFIED GASTRITIS TYPE: ICD-10-CM

## 2018-08-30 RX ORDER — RANITIDINE 150 MG/1
150 TABLET, FILM COATED ORAL 2 TIMES DAILY
Qty: 60 TAB | Refills: 3 | Status: SHIPPED | OUTPATIENT
Start: 2018-08-30

## 2018-08-30 NOTE — PROGRESS NOTES
Coordination of Care  1. Have you been to the ER, urgent care clinic since your last visit? Hospitalized since your last visit? No    2. Have you seen or consulted any other health care providers outside of the [address] [place] [address] since your last visit? Include any pap smears or colon screening. No    Does the patient need refills? YES    Learning Assessment Complete?  yes

## 2018-08-30 NOTE — PATIENT INSTRUCTIONS
Gastritis: Instrucciones de cuidado - [ Gastritis: Care Instructions ]  Instrucciones de cuidado    La gastritis es dolor y malestar estomacal. Sucede cuando algo irrita el revestimiento del estómago. Hay muchas cosas que pueden causarla. Entre estas se incluyen olivia infección sadaf la gripe o algo que ha comido o bebido. Los medicamentos o olivia llaga en el recubrimiento del estómago ([place]) también pueden causarla. Puede tener abotagamiento y dolor abdominal. Podría eructar, vomitar y tener revoltura estomacal.  Usted debería poder aliviar el problema tomando medicamentos. Y angeline vez sería útil cambiar la alimentación. Si la gastritis continúa, gates médico podría recetarle medicamentos. La atención de seguimiento es olivia parte clave de gates tratamiento y seguridad. Asegúrese de hacer y acudir a todas las citas, y llame a gates médico si está teniendo problemas. También es olivia buena idea saber los resultados de bert exámenes y mantener olivia lista de los medicamentos que nohemy. ¿Cómo puede cuidarse en el hogar? · Si gates médico le recetó antibióticos, tómelos según las indicaciones. No deje de tomarlos por el hecho de sentirse mejor. Debe saqib todos los antibióticos hasta terminarlos. · Sea tom con los medicamentos. Si gates médico le recetó un medicamento para reducir el ácido estomacal, tómelo según las indicaciones. Llame a gates médico si celso estar teniendo problemas con gates medicamento. · No tome ningún otro medicamento, incluyendo analgésicos (medicamentos para el dolor) de venta ant, sin consultar con gates médico oliva. · Si gates médico le recomienda medicamentos de venta ant para reducir el ácido estomacal, tales sadaf Pepcid AC, Prilosec, Tagamet HB o Zantac 75, siga las instrucciones de la etiqueta. · Zuleyma abundantes líquidos (los suficientes sadaf para que gates orina sea de color amarillo dahiana o transparente sadaf el agua) para prevenir la deshidratación. Elija saqib agua y otros líquidos fidelia sin cafeína.  Si tiene [place] [facility] [employer], el corazón o el hígado y tiene que [place]'s líquidos, hable con gates médico antes de aumentar gates consumo. · Limite la cantidad de alcohol que tanner. · Evite el café, el té, las bebidas de cola, el chocolate y otros alimentos que contengan cafeína. Aumentan el ácido estomacal.  ¿Cuándo debe pedir ayuda? Llame al 911 en cualquier momento que considere que necesita atención de [place]. Por ejemplo, llame si:    · Vomita gabi o algo parecido a granos de café molido.     · Adri heces son de color rojizo o muy sanguinolentas (con gabi).    Llame a gates médico ahora mismo o busque atención médica inmediata si:    · Empieza a respirar en forma acelerada y no ha producido orina en las últimas 8 horas.     · No puede retener líquidos en el estómago.    Preste especial atención a los cambios en gates ghada y asegúrese de comunicarse con gates médico si:    · No mejora sadaf se esperaba. ¿Dónde puede encontrar más información en inglés? [place] a [email or web address] Glorya Sacks O818 en la búsqueda para aprender más acerca de \"Gastritis: Instrucciones de cuidado - [ Gastritis: Care Instructions ]. \"  Revisado: 12 [place], 2017  Versión del contenido: 11.7  © 7325-5616 Healthwise, Incorporated. Las instrucciones de cuidado fueron adaptadas bajo licencia por Good Help Connections (which disclaims liability or warranty for this information). Si usted tiene [place] afección médica o sobre estas instrucciones, siempre pregunte a gates profesional de ghada. Healthwise, Incorporated niega toda garantía o responsabilidad por gates uso de esta información.

## 2018-08-30 NOTE — PROGRESS NOTES
Subjective:     Chief Complaint   Patient presents with    GI Problem     f/u        He  is a 23 y.o. male who presents for evaluation of chronic gastritis. Since LOV, Pt reports his GI S&S have improved slightly w/ new Rx use. Was unable to schedule AN screening for unclear reasons. Still having to adjust his sleeping position r/t discomfort of abdomen. Pain is generally worse prior to meals, improves with food. No assoc N/V/D, hematochezia nor diarrhea, has had some intermittent nausea and bloating. ROS  Gen - no fever/chills  Resp - no dyspnea or cough  CV - no chest pain or ORTEGA  Rest per HPI    No past medical history on file. No past surgical history on file. Current Outpatient Prescriptions on File Prior to Visit   Medication Sig Dispense Refill    raNITIdine (ZANTAC) 150 mg tablet Take 1 Tab by mouth two (2) times a day. 1 hr AC. [place] 1 tableta por boca 2 veces al alissa 1 hra antes de comer. 60 Tab 3     No current facility-administered medications on file prior to visit. Objective:     Vitals:    08/30/18 1352   BP: 118/68   Pulse: (!) 59   Temp: 98.3 °F (36.8 °C)   TempSrc: Oral   Weight: 134 lb 6.4 oz (61 kg)   Height: 5' 2.56\" (1.589 m)       Physical Examination:  General appearance - alert, well appearing, and in no distress  Eyes -sclera anicteric      Assessment/ Plan:   Diagnoses and all orders for this visit:    1. Chronic gastritis, presence of bleeding unspecified, unspecified gastritis type  -     raNITIdine (ZANTAC) 150 mg tablet; Take 1 Tab by mouth two (2) times a day. 1 hr AC. [place] 1 tableta por boca 2 veces al alissa 1 hra antes de comer.  -     REFERRAL TO GASTROENTEROLOGY       Re-enter GI referral. Given Hx/S&S, defer additional imaging given classic GERD/ulcer S&S. Refill Zantac. Discussed emergency S&S warranting ER eval.     RTC in 2-3 months for PCP f/u.    I have discussed the diagnosis with the patient and the intended plan as seen in the above orders. The patient has received an after-visit summary and questions were answered concerning future plans. I have discussed medication side effects and warnings with the patient as well. The patient verbalizes understanding and agreement with the plan.     Follow-up Disposition: Not on File

## 2018-10-02 ENCOUNTER — TELEPHONE (OUTPATIENT)
Dept: FAMILY MEDICINE CLINIC | Age: 23
End: 2018-10-02

## 2018-10-16 ENCOUNTER — OFFICE VISIT (OUTPATIENT)
Dept: FAMILY MEDICINE CLINIC | Age: 23
End: 2018-10-16

## 2018-10-16 DIAGNOSIS — Z71.89 COUNSELING AND COORDINATION OF CARE: Primary | ICD-10-CM

## 2018-11-29 ENCOUNTER — OFFICE VISIT (OUTPATIENT)
Dept: FAMILY MEDICINE CLINIC | Age: 23
End: 2018-11-29

## 2018-11-29 VITALS
HEIGHT: 62 IN | SYSTOLIC BLOOD PRESSURE: 112 MMHG | TEMPERATURE: 98.3 F | DIASTOLIC BLOOD PRESSURE: 63 MMHG | HEART RATE: 60 BPM | WEIGHT: 135 LBS | BODY MASS INDEX: 24.84 KG/M2

## 2018-11-29 DIAGNOSIS — Z23 ENCOUNTER FOR IMMUNIZATION: ICD-10-CM

## 2018-11-29 DIAGNOSIS — M25.512 CHRONIC LEFT SHOULDER PAIN: ICD-10-CM

## 2018-11-29 DIAGNOSIS — K29.50 CHRONIC GASTRITIS, PRESENCE OF BLEEDING UNSPECIFIED, UNSPECIFIED GASTRITIS TYPE: Primary | ICD-10-CM

## 2018-11-29 DIAGNOSIS — G89.29 CHRONIC LEFT SHOULDER PAIN: ICD-10-CM

## 2018-11-29 RX ORDER — DICLOFENAC SODIUM 10 MG/G
4 GEL TOPICAL 4 TIMES DAILY
Qty: 100 G | Refills: 3 | Status: SHIPPED | OUTPATIENT
Start: 2018-11-29

## 2018-11-29 RX ORDER — PANTOPRAZOLE SODIUM 40 MG/1
40 TABLET, DELAYED RELEASE ORAL DAILY
Qty: 30 TAB | Refills: 4 | Status: SHIPPED | OUTPATIENT
Start: 2018-11-29

## 2018-11-29 NOTE — PATIENT INSTRUCTIONS
Gastritis: Instrucciones de cuidado - [ Gastritis: Care Instructions ]  Instrucciones de cuidado    La gastritis es dolor y malestar estomacal. Sucede cuando algo irrita el revestimiento del estómago. Hay muchas cosas que pueden causarla. Entre estas se incluyen olivia infección sadaf la gripe o algo que ha comido o bebido. Los medicamentos o olivia llaga en el recubrimiento del estómago ([place]) también pueden causarla. Puede tener abotagamiento y dolor abdominal. Podría eructar, vomitar y tener revoltura estomacal.  Usted debería poder aliviar el problema tomando medicamentos. Y angeline vez sería útil cambiar la alimentación. Si la gastritis continúa, gates médico podría recetarle medicamentos. La atención de seguimiento es olivia parte clave de gates tratamiento y seguridad. Asegúrese de hacer y acudir a todas las citas, y llame a gates médico si está teniendo problemas. También es olivia buena idea saber los resultados de bert exámenes y mantener olivia lista de los medicamentos que nohemy. ¿Cómo puede cuidarse en el hogar? · Si gates médico le recetó antibióticos, tómelos según las indicaciones. No deje de tomarlos por el hecho de sentirse mejor. Debe saqib todos los antibióticos hasta terminarlos. · Sea tom con los medicamentos. Si gates médico le recetó un medicamento para reducir el ácido estomacal, tómelo según las indicaciones. Llame a gates médico si celso estar teniendo problemas con gates medicamento. · No tome ningún otro medicamento, incluyendo analgésicos (medicamentos para el dolor) de venta ant, sin consultar con gates médico oliva. · Si gates médico le recomienda medicamentos de venta ant para reducir el ácido estomacal, tales sadaf Pepcid AC, Prilosec, Tagamet HB o Zantac 75, siga las instrucciones de la etiqueta. · Zuleyma abundantes líquidos (los suficientes sadaf para que gates orina sea de color amarillo dahiana o transparente sadaf el agua) para prevenir la deshidratación. Elija saqib agua y otros líquidos fidelia sin cafeína.  Si tiene [facility] [employer], el corazón o el hígado y tiene que Esau's líquidos, hable con gates médico antes de aumentar gates consumo. · Limite la cantidad de alcohol que tanner. · Evite el café, el té, las bebidas de cola, el chocolate y otros alimentos que contengan cafeína. Aumentan el ácido estomacal.  ¿Cuándo debe pedir ayuda? Llame al 911 en cualquier momento que considere que necesita atención de [place]. Por ejemplo, llame si:    · Vomita gabi o algo parecido a granos de café molido.     · Adri heces son de color rojizo o muy sanguinolentas (con gabi).    Llame a gates médico ahora mismo o busque atención médica inmediata si:    · Empieza a respirar en forma acelerada y no ha producido orina en las últimas 8 horas.     · No puede retener líquidos en el estómago.    Preste especial atención a los cambios en gates ghada y asegúrese de comunicarse con gates médico si:    · No mejora sadaf se esperaba. ¿Dónde puede encontrar más información en inglés? Antonette Corralon a http://minnie-carmen.info/. Destiny Bowers C575 en la búsqueda para aprender más acerca de \"Gastritis: Instrucciones de cuidado - [ Gastritis: Care Instructions ]. \"  Revisado: [address], 2018  Versión del contenido: 11.8  © 2870-0769 [employer]wise, Inc[employer]. Las instrucciones de cuidado fueron adaptadas bajo licencia por [employer] (which disclaims liability or warranty for this information). Si usted tiene [place] afección médica o sobre estas instrucciones, siempre pregunte a gates profesional de ghada. Healthwise, Incorporated niega toda garantía o responsabilidad por gates uso de esta información.

## 2018-11-29 NOTE — PROGRESS NOTES
The following was performed at discharge station per provider with the assistance of , Lucie Taveras:    AVS printed, reviewed with pt and given to pt. RN printed Good RX coupon for Diclofenac gel, $35 And Protonix, $11.74, at [facility] and gave to pt. Advised pt to return if these medications do not work for him. Please see scanned encounter form. Pt expressed understanding of the above information. Ayan Garcia. The following was documented by Serena Barclay RN. Patient stated no egg allergy. Please see scanned encounter form. FLU Immunization given per protocol. Documented in [facility] and updated copy given to patient. VIIS information sheet given with instructions concerning adverse effects and allergic reaction which would indicate need to be seen in the ER. Patient expressed understanding. Pt had no adverse reaction at time of discharge.   Serena Barclay RN

## 2018-11-29 NOTE — PROGRESS NOTES
HISTORY OF PRESENT ILLNESS  Boris Powell is a 20 y.o. male. HPI  Patient suffers with chronic gastritis,  He was given medications that helped but not completely. States in the morning when he wakes up he has abdominal pains and this has not gotten better. States he sometimes sleeps to one side and has to shift to the other so that the pain gets better. Patient feels hungry, he tries to eat, he eats a little bit but has to stop eating. Denies blood in stools, denies hematemesis, no melena. Has appointment with GI in January. In the diet he avoids coffee, greasy foods, tomatoes. Will avoid pepper and spicy foods. Left shoulder pain chronic problem. Started after doing multiple push ups 1 year ago  Review of Systems   Constitutional: Negative for chills, fever and weight loss. HENT: Negative for hearing loss and tinnitus. Eyes: Negative for blurred vision, double vision and photophobia. Respiratory: Negative for cough, hemoptysis and sputum production. Cardiovascular: Negative for chest pain, palpitations and orthopnea. Gastrointestinal: Positive for abdominal pain, heartburn and nausea. Negative for blood in stool, constipation, diarrhea, melena and vomiting. Genitourinary: Negative for dysuria, frequency, hematuria and urgency. Musculoskeletal: Positive for joint pain. Negative for back pain, myalgias and neck pain. Skin: Negative for itching and rash. Neurological: Negative for dizziness, tingling, tremors and headaches. Psychiatric/Behavioral: Negative for depression and suicidal ideas. /63   Pulse 60   Temp 98.3 °F (36.8 °C)   Ht 5' 2.01\" (1.575 m)   Wt 135 lb (61.2 kg)   BMI 24.69 kg/m²   Physical Exam   Constitutional: He is oriented to person, place, and time. He appears well-developed and well-nourished. HENT:   Head: Normocephalic.    Right Ear: External ear normal.   Left Ear: External ear normal.   Eyes: Conjunctivae and EOM are normal. Pupils are equal, round, and reactive to light. Neck: Normal range of motion. Neck supple. No thyromegaly present. Cardiovascular: Normal rate, regular rhythm and normal heart sounds. No murmur heard. Pulmonary/Chest: Effort normal and breath sounds normal. No respiratory distress. He has no wheezes. He has no rales. Abdominal: Soft. Bowel sounds are normal. He exhibits no distension. There is no tenderness. Musculoskeletal: Normal range of motion. He exhibits no edema or tenderness. Neurological: He is alert and oriented to person, place, and time. He has normal reflexes. Skin: Skin is warm. No erythema. ASSESSMENT and PLAN  Diagnoses and all orders for this visit:    1. Chronic gastritis, presence of bleeding unspecified, unspecified gastritis type  -     pantoprazole (PROTONIX) 40 mg tablet; Take 1 Tab by mouth daily. 2. Chronic left shoulder pain  -     diclofenac (VOLTAREN) 1 % gel; Apply 4 g to affected area four (4) times daily. Left shoulder      Patient has scheduled appointment with GI January 9th. Left shoulder pain, chronic, Will order diclofenac 1% gel.

## 2019-02-01 ENCOUNTER — ANESTHESIA EVENT (OUTPATIENT)
Dept: ENDOSCOPY | Age: 24
End: 2019-02-01
Payer: SUBSIDIZED

## 2019-02-01 ENCOUNTER — HOSPITAL ENCOUNTER (OUTPATIENT)
Age: 24
Setting detail: OUTPATIENT SURGERY
Discharge: HOME | End: 2019-02-01
Attending: INTERNAL MEDICINE | Admitting: INTERNAL MEDICINE
Payer: SUBSIDIZED

## 2019-02-01 ENCOUNTER — ANESTHESIA (OUTPATIENT)
Dept: ENDOSCOPY | Age: 24
End: 2019-02-01
Payer: SUBSIDIZED

## 2019-02-01 VITALS
TEMPERATURE: 97.9 F | RESPIRATION RATE: 10 BRPM | DIASTOLIC BLOOD PRESSURE: 64 MMHG | OXYGEN SATURATION: 100 % | SYSTOLIC BLOOD PRESSURE: 104 MMHG | WEIGHT: 133 LBS | HEART RATE: 54 BPM | BODY MASS INDEX: 24.48 KG/M2 | HEIGHT: 62 IN

## 2019-02-01 LAB
H PYLORI FROM TISSUE: NEGATIVE
KIT LOT NO., HCLOLOT: NORMAL
NEGATIVE CONTROL: NEGATIVE
POSITIVE CONTROL: POSITIVE

## 2019-02-01 PROCEDURE — 76060000031 HC ANESTHESIA FIRST 0.5 HR: Performed by: INTERNAL MEDICINE

## 2019-02-01 PROCEDURE — 88305 TISSUE EXAM BY PATHOLOGIST: CPT

## 2019-02-01 PROCEDURE — 76040000019: Performed by: INTERNAL MEDICINE

## 2019-02-01 PROCEDURE — 74011250636 HC RX REV CODE- 250/636

## 2019-02-01 PROCEDURE — 74011250636 HC RX REV CODE- 250/636: Performed by: INTERNAL MEDICINE

## 2019-02-01 PROCEDURE — 87077 CULTURE AEROBIC IDENTIFY: CPT | Performed by: INTERNAL MEDICINE

## 2019-02-01 PROCEDURE — 77030009426 HC FCPS BIOP ENDOSC BSC -B: Performed by: INTERNAL MEDICINE

## 2019-02-01 RX ORDER — FLUMAZENIL 0.1 MG/ML
0.2 INJECTION INTRAVENOUS
Status: DISCONTINUED | OUTPATIENT
Start: 2019-02-01 | End: 2019-02-01 | Stop reason: HOSPADM

## 2019-02-01 RX ORDER — LIDOCAINE HYDROCHLORIDE 20 MG/ML
INJECTION, SOLUTION EPIDURAL; INFILTRATION; INTRACAUDAL; PERINEURAL AS NEEDED
Status: DISCONTINUED | OUTPATIENT
Start: 2019-02-01 | End: 2019-02-01 | Stop reason: HOSPADM

## 2019-02-01 RX ORDER — PROPOFOL 10 MG/ML
INJECTION, EMULSION INTRAVENOUS
Status: DISCONTINUED | OUTPATIENT
Start: 2019-02-01 | End: 2019-02-01 | Stop reason: HOSPADM

## 2019-02-01 RX ORDER — MIDAZOLAM HYDROCHLORIDE 1 MG/ML
.25-5 INJECTION, SOLUTION INTRAMUSCULAR; INTRAVENOUS
Status: DISCONTINUED | OUTPATIENT
Start: 2019-02-01 | End: 2019-02-01 | Stop reason: HOSPADM

## 2019-02-01 RX ORDER — PROPOFOL 10 MG/ML
INJECTION, EMULSION INTRAVENOUS AS NEEDED
Status: DISCONTINUED | OUTPATIENT
Start: 2019-02-01 | End: 2019-02-01 | Stop reason: HOSPADM

## 2019-02-01 RX ORDER — NALOXONE HYDROCHLORIDE 0.4 MG/ML
0.4 INJECTION, SOLUTION INTRAMUSCULAR; INTRAVENOUS; SUBCUTANEOUS
Status: DISCONTINUED | OUTPATIENT
Start: 2019-02-01 | End: 2019-02-01 | Stop reason: HOSPADM

## 2019-02-01 RX ORDER — SODIUM CHLORIDE 9 MG/ML
50 INJECTION, SOLUTION INTRAVENOUS CONTINUOUS
Status: DISCONTINUED | OUTPATIENT
Start: 2019-02-01 | End: 2019-02-01 | Stop reason: HOSPADM

## 2019-02-01 RX ORDER — EPINEPHRINE 0.1 MG/ML
1 INJECTION INTRACARDIAC; INTRAVENOUS
Status: DISCONTINUED | OUTPATIENT
Start: 2019-02-01 | End: 2019-02-01 | Stop reason: HOSPADM

## 2019-02-01 RX ORDER — DEXTROMETHORPHAN/PSEUDOEPHED 2.5-7.5/.8
1.2 DROPS ORAL
Status: DISCONTINUED | OUTPATIENT
Start: 2019-02-01 | End: 2019-02-01 | Stop reason: HOSPADM

## 2019-02-01 RX ORDER — ATROPINE SULFATE 0.1 MG/ML
0.4 INJECTION INTRAVENOUS
Status: DISCONTINUED | OUTPATIENT
Start: 2019-02-01 | End: 2019-02-01 | Stop reason: HOSPADM

## 2019-02-01 RX ADMIN — PROPOFOL 80 MG: 10 INJECTION, EMULSION INTRAVENOUS at 10:43

## 2019-02-01 RX ADMIN — LIDOCAINE HYDROCHLORIDE 40 MG: 20 INJECTION, SOLUTION EPIDURAL; INFILTRATION; INTRACAUDAL; PERINEURAL at 10:42

## 2019-02-01 RX ADMIN — SODIUM CHLORIDE: 9 INJECTION, SOLUTION INTRAVENOUS at 10:15

## 2019-02-01 RX ADMIN — PROPOFOL 140 MCG/KG/MIN: 10 INJECTION, EMULSION INTRAVENOUS at 10:43

## 2019-02-01 NOTE — ROUTINE PROCESS
[facility] 1995 
[id number] Situation: 
Verbal report received from: Deisi Adhikari Procedure: Procedure(s): ESOPHAGOGASTRODUODENOSCOPY (EGD) ESOPHAGOGASTRODUODENAL (EGD) BIOPSY Background: 
 
Preoperative diagnosis: EPIGASTRIC PAIN Postoperative diagnosis: * No post-op diagnosis entered * :  Dr. Niraj Resendiz Assistant(s): Endoscopy Technician-1: Maranda Watts Endoscopy RN-1: Mallory Chavarria RN Specimens:  
ID Type Source Tests Collected by Time Destination 1 : Biopsy Preservative Duodenum  Debora Ordonez MD 2/1/2019 1051 Pathology 2 : Biopsy Preservative Esophagus, Mid  Debora Ordonez MD 2/1/2019 1051 Pathology H. Pylori  yes Assessment: 
Intra-procedure medications Anesthesia gave intra-procedure sedation and medications, see anesthesia flow sheet yes Intravenous fluids: NS@ [facility] Vital signs stable Abdominal assessment: round and soft Recommendation: 
Discharge patient per MD order. Family or Friend Permission to share finding with family or friend yes

## 2019-02-01 NOTE — ANESTHESIA POSTPROCEDURE EVALUATION
Procedure(s): ESOPHAGOGASTRODUODENOSCOPY (EGD) ESOPHAGOGASTRODUODENAL (EGD) BIOPSY. Anesthesia Post Evaluation Multimodal analgesia: multimodal analgesia not used between 6 hours prior to anesthesia start to PACU discharge Patient location during evaluation: bedside Patient participation: complete - patient participated Level of consciousness: awake Pain management: adequate Airway patency: patent Anesthetic complications: no 
Cardiovascular status: acceptable Respiratory status: acceptable Hydration status: acceptable Post anesthesia nausea and vomiting:  none Visit Vitals /59 Pulse (!) 54 Temp 36.6 °C (97.9 °F) Resp 12 Ht 5' 2\" (1.575 m) Wt 60.3 kg (133 lb) SpO2 100% BMI 24.33 kg/m²

## 2019-02-01 NOTE — DISCHARGE INSTRUCTIONS
Clarissa Jarvis M.D.  [phone]           2019  Boris Connors January  :  1995  [facility] Medical Record Number:  [id number]        ENDOSCOPY FINDINGS:   Your endoscopy showed normal mucosa throughout the esophagus, stomach and duodenum. EGD DISCHARGE INSTRUCTIONS    DISCOMFORT:  Sore throat- throat lozenges or warm salt water gargle  redness at IV site- apply warm compress to area; if redness or soreness persist- contact your physician  Gaseous discomfort- walking, belching will help relieve any discomfort  You may not operate a vehicle for 12 hours  You may not engage in an occupation involving machinery or appliances for rest of today  You may not drink alcoholic beverages for at least 12 hours  Avoid making any critical decisions for at least 24 hour    DIET:   You may resume your regular diet. ACTIVITY  Spend the remainder of the day resting -  avoid any strenuous activity. Avoid driving or operating machinery. CALL M.D. ANY SIGN OF   Increasing pain, nausea, vomiting  Abdominal distension (swelling)  New increased bleeding (oral or rectal)  Fever (chills)  Pain in chest area  Bloody discharge from nose or mouth  Shortness of breath    Follow-up Instructions:   Call Dr. Wayne Valdovinos for any questions or problems. Telephone # [phone]  Biopsies were obtained, the results will be available  in  5 to 7 days. We will call you to notify you of these results. Continue same medications. Follow up in the office.

## 2019-02-01 NOTE — ANESTHESIA PREPROCEDURE EVALUATION
Anesthetic History No history of anesthetic complications Review of Systems / Medical History Patient summary reviewed, nursing notes reviewed and pertinent labs reviewed Pulmonary Within defined limits Neuro/Psych Within defined limits Cardiovascular Exercise tolerance: >4 METS 
  
GI/Hepatic/Renal 
  
GERD Endo/Other Within defined limits Other Findings Physical Exam 
 
Airway Mallampati: II 
TM Distance: 4 - 6 cm Neck ROM: normal range of motion Mouth opening: Normal 
 
 Cardiovascular Rhythm: regular Rate: normal 
 
 
 
 Dental 
No notable dental hx Pulmonary Breath sounds clear to auscultation Abdominal 
GI exam deferred Other Findings Anesthetic Plan ASA: 2 Anesthesia type: MAC Anesthetic plan and risks discussed with: Patient

## 2019-02-01 NOTE — H&P
The patient is a 20year old male who presents with a complaint of Abdominal Pain. The patient presents for consultation at the request of Dr. Eric Aguila). The abdominal pain is described as burning . Note for \"Abdominal Pain\": He has a burning sensation in the stomach hurts mostly in the stomach. Sometimes at lunch he gets very hungry starts to eat and feels very nauseated. He has had these symptoms for a year, he was prescribed pantoprazole and it has helped only some. He admits to sour taste in the morning. He denies late night snacks. He denies any black stools or blood in the stools. He has lost about 3 to 4 lbs. He wakes up at night with epigastric pain. His grandfather had stomach problems and likely had an ulcer, he denies any family history of gallstones. He denies oral NSAIDs intake, he used to smoke, stopped about a year ago. He admits to intermittent alcohol intake. Past Surgical History Juan Coronado; 3/8/3047 9:54 AM) Non-Contributory Past Surgical History   
 
Allergies (Juan Coronado; 8/9/9991 9:53 AM) No Known Allergies  [01/03/2019]: 
No Known Drug Allergies  [01/03/2019]: 
 
Medication History Juan Coronado; 0/8/6881 9:57 AM) Pantoprazole Sodium  (40MG Tablet DR, 1 Oral daily) Active. RaNITidine HCl  (150MG Capsule, 1 Oral biD) Active. Medications Reconciled  
 
Family History Juan Coronado; 6/3/8613 9:55 AM) Anxiety Disorder   Father. Social History Juan Coronado; 0/4/8279 9:53 AM) Tobacco Use   Former smoker. Alcohol Use   Occasional alcohol use. Marital status   Single. Employment status   Part-time. Blood Transfusion   No. 
 
Health Maintenance History (Juan Coronado; 7/0/9521 9:53 AM) Flu Vaccine  [2018]: 
Pneumovax   none Review of Systems (Juan Coronado; 1/7/9025 9:53 AM) General Not Present- Chronic Fatigue, Poor Appetite, Weight Gain and Weight Loss. Skin Not Present- Itching, Rash and Skin Color Changes. HEENT Not Present- Hearing Loss and Vertigo. Respiratory Not Present- Difficulty Breathing and TB exposure. Cardiovascular Not Present- Chest Pain, Use of Antibiotics before Dental Procedures and Use of Blood Thinners. Gastrointestinal Present- See HPI. Musculoskeletal Not Present- Arthritis, Hip Replacement Surgery and Knee Replacement Surgery. Neurological Not Present- Weakness. Psychiatric Not Present- Depression. Endocrine Not Present- Diabetes and Thyroid Problems. Hematology Not Present- Anemia. Vitals (Avani Morris; 9/6/6930 9:59 AM) 
1/9/2019 9:53 AM 
Weight: 133.8 lb   Height: 63 in  
Body Surface Area: 1.63 m²   Body Mass Index: 23.7 kg/m²   
Pulse: 55 (Regular)    
BP: 112/67 (Sitting, Left Arm, Standard) Physical Exam (Sudhir Balbuena MD; 1/9/2019 10:23 AM) General 
Mental Status - Alert. General Appearance - Cooperative, Pleasant, Not in acute distress. Orientation - Oriented X3. Build & Nutrition - Well nourished and Well developed. Integumentary General Characteristics Overall examination of the patient's skin reveals - no rashes, no bruises and no spider angiomas. Color - normal coloration of skin. Head and Neck Neck Global Assessment - full range of motion and supple, no bruit auscultated on the right, no bruit auscultated on the left, non-tender, no lymphadenopathy. Thyroid Gland Characteristics - normal size and consistency. Eye Eyeball - Left - No Exophthalmos. Eyeball - Right - No Exophthalmos. Sclera/Conjunctiva - Left - No Jaundice. Sclera/Conjunctiva - Right - No Jaundice. Chest and Lung Exam 
Chest and lung exam reveals  - quiet, even and easy respiratory effort with no use of accessory muscles. Auscultation Breath sounds - Normal. Adventitious sounds - No Adventitious sounds. Cardiovascular Auscultation Rhythm - Regular, No Tachycardia, No Bradycardia . Heart Sounds - Normal heart sounds , S1 WNL and S2 WNL, No S3, No Summation Gallop.  Murmurs & Other Heart Sounds - Auscultation of the heart reveals - No Murmurs. Abdomen Inspection Inspection of the abdomen reveals - Non-distended. Palpation/Percussion Tenderness - Non-Tender. Rebound tenderness - No rebound. Liver - No hepatosplenomegaly. Abdominal Mass Palpable - No masses. Other Characteristics - No Ascites. Organomegally - None. Auscultation Auscultation of the abdomen reveals - Bowel sounds normal, No Abdominal bruits and No Succussion splash. Rectal - Did not examine. Peripheral Vascular Upper Extremity Inspection - Left - Normal - No Clubbing, No Cyanosis, No Edema, Pulses Intact. Right - Normal - No Clubbing, No Cyanosis, No Edema, Pulses Intact. Palpation - Edema - Left - No edema. Right - No edema. Lower Extremity Inspection - Left - Inspection Normal. Right - Inspection Normal. Palpation - Edema - Left - No edema. Right - No edema. Neurologic Neurologic evaluation reveals  - Cranial nerves grossly intact, no focal neurologic deficits. Motor Involuntary Movements - Asterixis - not present. Musculoskeletal 
Global Assessment Gait and Station - normal gait and station. Assessment & Plan (Sudhir Larios MD; 1/9/2019 3:24 PM) Abdominal pain, epigastric (789.06  R10.13) Impression: Unclear etiology of pain that persist despite acid suppression, will proceed with EGD and biopsies and start hyoscyamine for symptom relief, if symptoms are persistent, abdominal ultrasound will be needed and possible need for small bowel series as well. He was instructed to avoid any alcohol or NSAIDs, he does not smoke. Current Plans Started Hyoscyamine Sulfate 0.125MG, 1 (one) Tablet at bedtime and as needed every 8 hours, #90, 30 days starting 01/09/2019, Ref. x3. 
Endoscopy (67598) (Discussed risks and benefits with the patient to include: perforation, post polypectomy, or post biopsy bleeding, missed lesions, and sedation reactions.) Started Pantoprazole Sodium 40MG, 1 (one) Tablet DR daily, #60, 30 days starting 01/09/2019, Ref. x3. 
Pt Education - How to access health information online: discussed with patient and provided information. Patient is to call me for any questions or concerns. Signed electronically by Alberto Whiting MD (1/9/2019 3:26 PM)

## 2019-02-01 NOTE — PROCEDURES
Tayla Marte M.D.  [phone]           2019                EGD Operative Report  Boris Smith  :  1995  [facility] Medical Record Number:  [id number]      Indication:  Abdominal pain, epigastric     : Chacho Brice MD    Referring Provider:  None      Anesthesia/Sedation:  MAC anesthesia    Airway assessment: No airway problems anticipated    Pre-Procedural Exam:      Airway: clear, no airway problems anticipated  Heart: RRR, without gallops or rubs  Lungs: clear bilaterally without wheezes, crackles, or rhonchi  Abdomen: soft, nontender, nondistended, bowel sounds present  Mental Status: awake, alert and oriented to person, place and time       Procedure Details     After infomed consent was obtained for the procedure, with all risks and benefits of procedure explained the patient was taken to the endoscopy suite and placed in the left lateral decubitus position. Following sequential administration of sedation as per above, the endoscope was inserted into the mouth and advanced under direct vision to second portion of the duodenum. A careful inspection was made as the gastroscope was withdrawn, including a retroflexed view of the proximal stomach; findings and interventions are described below. Findings:   Esophagus:normal  Stomach: normal   Duodenum/jejunum: normal    Therapies:  none    Specimens: [place], duodenum and mid-esophagus           Complications:   None; patient tolerated the procedure well. EBL:  None.            Impression:    Mucosa within normal    Recommendations:    -Continue acid suppression.  -Await pathology.  -Await MARYURI test result and treat for Helicobacter pylori if positive.  -Follow-up in the office    Chacho Brice MD

## 2019-03-21 ENCOUNTER — TELEPHONE (OUTPATIENT)
Dept: FAMILY MEDICINE CLINIC | Age: 24
End: 2019-03-21
